# Patient Record
Sex: MALE | Race: WHITE | URBAN - METROPOLITAN AREA
[De-identification: names, ages, dates, MRNs, and addresses within clinical notes are randomized per-mention and may not be internally consistent; named-entity substitution may affect disease eponyms.]

---

## 2020-06-26 ENCOUNTER — OFFICE VISIT (OUTPATIENT)
Dept: URBAN - METROPOLITAN AREA CLINIC 45 | Facility: CLINIC | Age: 80
End: 2020-06-26
Payer: MEDICARE

## 2020-06-26 DIAGNOSIS — H25.13 AGE-RELATED NUCLEAR CATARACT, BILATERAL: ICD-10-CM

## 2020-06-26 DIAGNOSIS — H55.89 OTHER IRREGULAR EYE MOVEMENTS: ICD-10-CM

## 2020-06-26 DIAGNOSIS — E11.9 TYPE 2 DIABETES MELLITUS W/O COMPLICATION: ICD-10-CM

## 2020-06-26 DIAGNOSIS — H53.2 DIPLOPIA: Primary | ICD-10-CM

## 2020-06-26 DIAGNOSIS — H43.89 OTHER DISORDERS OF VITREOUS BODY: ICD-10-CM

## 2020-06-26 PROCEDURE — 92004 COMPRE OPH EXAM NEW PT 1/>: CPT | Performed by: OPTOMETRIST

## 2020-06-26 PROCEDURE — 92134 CPTRZ OPH DX IMG PST SGM RTA: CPT | Performed by: OPTOMETRIST

## 2020-06-26 ASSESSMENT — INTRAOCULAR PRESSURE
OD: 12
OS: 12

## 2020-06-26 ASSESSMENT — KERATOMETRY
OD: 41.50
OS: 41.38

## 2020-06-26 NOTE — IMPRESSION/PLAN
Impression: Type 2 diabetes mellitus w/o complication: S05.1. OU. Plan: No Non-Proliferative Diabetic Retinopathy, no Diabetic Macular Edema and no Neovascularization of the iris, disc, or elsewhere. OCT performed and reviewed today, no sign of diabetic macular edema OU. Discussed ocular and systemic benefits of blood sugar control. Send notes to PCP. Check annually. RTC 1 year x CEE.

## 2020-06-26 NOTE — IMPRESSION/PLAN
Impression: Diplopia: H53.2. OU. Plan: Significant diplopia OU w/irregular eye movement OU. Refer for consult w/Dr. Vivian Melton, soonest available. Continue follow up care with neurology, CAT scan performed and negative results per pt. Report to ER immediately if condition worsens or new symptoms occur.

## 2020-06-26 NOTE — IMPRESSION/PLAN
Impression: Other disorders of vitreous body: H43.89. OU. Plan: Mild Vitreomacular traction OU. OCT performed and reviewed. No tx at this time, recommend consult w/retina specialist prn.